# Patient Record
Sex: MALE | Race: WHITE | URBAN - METROPOLITAN AREA
[De-identification: names, ages, dates, MRNs, and addresses within clinical notes are randomized per-mention and may not be internally consistent; named-entity substitution may affect disease eponyms.]

---

## 2023-06-01 ENCOUNTER — HOSPITAL ENCOUNTER (OUTPATIENT)
Age: 23
Discharge: HOME | End: 2023-06-01
Payer: COMMERCIAL

## 2023-06-01 ENCOUNTER — APPOINTMENT (OUTPATIENT)
Dept: GENERAL RADIOLOGY | Age: 23
End: 2023-06-01
Attending: PHYSICIAN ASSISTANT
Payer: COMMERCIAL

## 2023-06-01 VITALS
TEMPERATURE: 98 F | DIASTOLIC BLOOD PRESSURE: 80 MMHG | HEART RATE: 85 BPM | OXYGEN SATURATION: 100 % | SYSTOLIC BLOOD PRESSURE: 128 MMHG | RESPIRATION RATE: 20 BRPM

## 2023-06-01 DIAGNOSIS — J02.9 ACUTE VIRAL PHARYNGITIS: Primary | ICD-10-CM

## 2023-06-01 DIAGNOSIS — R05.1 ACUTE COUGH: ICD-10-CM

## 2023-06-01 LAB — S PYO AG THROAT QL IA.RAPID: NEGATIVE

## 2023-06-01 PROCEDURE — 99204 OFFICE O/P NEW MOD 45 MIN: CPT

## 2023-06-01 PROCEDURE — 87651 STREP A DNA AMP PROBE: CPT | Performed by: PHYSICIAN ASSISTANT

## 2023-06-01 PROCEDURE — 71046 X-RAY EXAM CHEST 2 VIEWS: CPT | Performed by: PHYSICIAN ASSISTANT

## 2023-06-01 RX ORDER — ALBUTEROL SULFATE 90 UG/1
2 AEROSOL, METERED RESPIRATORY (INHALATION) EVERY 4 HOURS PRN
Qty: 1 EACH | Refills: 0 | Status: SHIPPED | OUTPATIENT
Start: 2023-06-01 | End: 2023-07-01

## 2023-06-01 RX ORDER — IBUPROFEN 600 MG/1
TABLET ORAL
Qty: 20 TABLET | Refills: 0 | Status: SHIPPED | OUTPATIENT
Start: 2023-06-01

## 2023-06-01 RX ORDER — BENZONATATE 100 MG/1
100 CAPSULE ORAL 3 TIMES DAILY PRN
Qty: 30 CAPSULE | Refills: 0 | Status: SHIPPED | OUTPATIENT
Start: 2023-06-01 | End: 2023-07-01

## 2024-05-28 ENCOUNTER — APPOINTMENT (OUTPATIENT)
Dept: GENERAL RADIOLOGY | Age: 24
End: 2024-05-28
Attending: NURSE PRACTITIONER

## 2024-05-28 ENCOUNTER — HOSPITAL ENCOUNTER (OUTPATIENT)
Age: 24
Discharge: HOME | End: 2024-05-28

## 2024-05-28 VITALS
OXYGEN SATURATION: 98 % | DIASTOLIC BLOOD PRESSURE: 91 MMHG | HEART RATE: 86 BPM | TEMPERATURE: 98 F | SYSTOLIC BLOOD PRESSURE: 141 MMHG | BODY MASS INDEX: 38.43 KG/M2 | WEIGHT: 290 LBS | HEIGHT: 73 IN | RESPIRATION RATE: 20 BRPM

## 2024-05-28 DIAGNOSIS — U07.1 COVID-19 VIRUS INFECTION: Primary | ICD-10-CM

## 2024-05-28 LAB
POCT INFLUENZA A: NEGATIVE
POCT INFLUENZA B: NEGATIVE
SARS-COV-2 RNA RESP QL NAA+PROBE: DETECTED

## 2024-05-28 PROCEDURE — 71046 X-RAY EXAM CHEST 2 VIEWS: CPT | Performed by: NURSE PRACTITIONER

## 2024-05-28 PROCEDURE — 99214 OFFICE O/P EST MOD 30 MIN: CPT

## 2024-05-28 PROCEDURE — 87502 INFLUENZA DNA AMP PROBE: CPT | Performed by: NURSE PRACTITIONER

## 2024-05-28 NOTE — ED PROVIDER NOTES
Patient Seen in: [facility]      History     Chief Complaint   Patient presents with    Flu     Day 5 of having a cold/flu. Sinus pressure, swollen lymph nodes, cough, plugged nose, sneezing, itchy eyes, diarrhea, hot/cold temperature - Entered by patient     Stated Complaint: Flu - Day 5 of having a cold/flu. Sinus pressure, swollen lymph nodes, cough, p*    Subjective:   HPI  22 yo male presents complaining of congestion, rhionorrhea, body aches, chills, feeling like tactile fever, and cough that started Friday.  Patient then with diarrhea since Saturday or Sunday with 3-4 episodes yesterday and one today without abdominal pain.  No known sick contacts.    Objective:   History reviewed. No pertinent past medical history.           History reviewed. No pertinent surgical history.             Social History     Socioeconomic History    Marital status: Single   Tobacco Use    Smoking status: Never    Smokeless tobacco: Never   Vaping Use    Vaping status: Never Used   Substance and Sexual Activity    Alcohol use: Never    Drug use: Never     Social Determinants of Health     Food Insecurity: Low Risk  (2/9/2023)    Received from [facility]    Food Insecurity     Have there been times that your food ran out, and you didn't have money to get more?: No     Are there times that you worry that this might happen?: No   Transportation Needs: Low Risk  (2/9/2023)    Received from [facility]    Transportation Needs     Do you have trouble getting transportation to medical appointments?: No     How do you normally get to and from your appointments?: Other              Review of Systems   All other systems reviewed and are negative.      Positive for stated complaint: Flu - Day 5 of having a cold/flu. Sinus pressure, swollen lymph nodes, cough, p*  Other systems are as noted in HPI.  Constitutional and vital signs reviewed.      All other systems reviewed and  negative except as noted above.    Physical Exam     ED Triage Vitals [05/28/24 1042]   BP (!) 141/91   Pulse 86   Resp 20   Temp 97.8 °F (36.6 °C)   Temp src Temporal   SpO2 98 %   O2 Device None (Room air)       Current Vitals:   Vital Signs  BP: (!) 141/91  Pulse: 86  Resp: 20  Temp: 97.8 °F (36.6 °C)  Temp src: Temporal    Oxygen Therapy  SpO2: 98 %  O2 Device: None (Room air)            Physical Exam  Vitals and nursing note reviewed.   Constitutional:       General: He is not in acute distress.     Appearance: He is well-developed. He is not ill-appearing or toxic-appearing.   HENT:      Right Ear: Tympanic membrane, ear canal and external ear normal.      Left Ear: Tympanic membrane, ear canal and external ear normal.      Nose: Congestion present. No rhinorrhea.      Mouth/Throat:      Pharynx: No oropharyngeal exudate or posterior oropharyngeal erythema.   Cardiovascular:      Rate and Rhythm: Normal rate and regular rhythm.      Heart sounds: Normal heart sounds.   Pulmonary:      Effort: Pulmonary effort is normal.      Breath sounds: Normal breath sounds.   Skin:     General: Skin is warm and dry.   Neurological:      Mental Status: He is alert and oriented to person, place, and time.             ED Course     Labs Reviewed   RAPID SARS-COV-2 BY PCR - Abnormal; Notable for the following components:       Result Value    Rapid SARS-CoV-2 by PCR Detected (*)     All other components within normal limits   POCT FLU TEST - Normal    Narrative:     This assay is a rapid molecular in vitro test utilizing nucleic acid amplification of influenza A and B viral RNA.             XR CHEST PA + LAT CHEST (CPT=71046)    Result Date: 5/28/2024  PROCEDURE:  XR CHEST PA + LAT CHEST (CPT=71046)  INDICATIONS:  cough positive COVID-19  COMPARISON:  None.  TECHNIQUE:  PA and lateral chest radiographs were obtained.  PATIENT STATED HISTORY: (As transcribed by Technologist)  Patient states sinus pressure, cough, congestion,  chills and diarrhea for the past 5 days. Turned out COVID-19 positive today.    FINDINGS:  LUNGS:  No focal consolidation.  Normal vascularity. CARDIAC:  Normal size cardiac silhouette. MEDIASTINUM:  Normal. PLEURA:  Normal.  No pleural effusions. BONES:  Normal for age.            CONCLUSION:  No acute process   LOCATION:  [facility]   Dictated by (CST): Rod Tolbert MD on 5/28/2024 at 11:52 AM     Finalized by (CST): Rod Tolbert MD on 5/28/2024 at 11:53 AM             [facility]     Medical Decision Making  24 yo male presents complaining of congestion, rhionorrhea, body aches, chills, feeling like tactile fever, and cough that started Friday.  Patient then with diarrhea since Saturday or Sunday with 3-4 episodes yesterday and one today without abdominal pain.  No known sick contacts.    Clinical impression: COVID    Differential diagnosis: COVID, flu, pneumonia    COVID-positive with negative flu.  Chest x-ray with no consolidation.  Patient is afebrile with normal oxygen on room air.  He will be discharged with symptomatic treatment with Tylenol/Motrin and Mucinex.    Problems Addressed:  COVID-19 virus infection: acute illness or injury    Amount and/or Complexity of Data Reviewed  Radiology: ordered and independent interpretation performed.     Details: Chest x-ray ordered with independent interpretation by myself as no consolidation        Disposition and Plan     Clinical Impression:  1. COVID-19 virus infection         Disposition:  Discharge  5/28/2024 12:01 pm    Follow-up:  No follow-up provider specified.        Medications Prescribed:  Discharge Medication List as of 5/28/2024 12:02 PM

## 2024-05-28 NOTE — ED INITIAL ASSESSMENT (HPI)
Symptoms started Friday:Sinus pressure, runny nose that comes congested, bodyaches, chills, body feels hot but didn't take temp. Has an occasional cough. Pt states having diarrhea can't recall if it started Saturday or Sunday. Denies emesis or nausea. Has minor headaches. Sore throat in the mornings.

## 2024-05-28 NOTE — DISCHARGE INSTRUCTIONS
Tylenol and Motrin alternating for body aches or fever.  Mucinex for congestion and cough.  Increase oral fluids.

## (undated) NOTE — LETTER
Patient: Feliz Odell   YOB: 2000   Date of Visit: 5/28/2024     Dear Employer,        May 28, 2024    At [facility], we are taking special precautions and doing everything we can to prevent the spread of COVID-19. During this time, we ask for your assistance regarding physician documentation for employees to return to work following a respiratory illness.     The Centers for Disease Control (CDC) recommends the following:    Ensure that sick leave policies are flexible and consistent with public health guidance, and employees are aware of and understand these policies.   Maintain flexible policies that permit employees to stay home to care for a sick family member or to take care of children due to school and  closures.   Employers should not require a COVID-19 test result or a healthcare provider’s note for sick employees to validate their illness, qualify for sick leave or return to work.   Be aware that healthcare provider offices and medical facilities may be extremely busy and not able to provide documentation in a timely manner. Most people with COVID-19 have mild illness, can recover at home without medical care and can follow CDC recommendations to determine when to discontinue home isolation and return to work.      Individuals with COVID-19 symptoms directed to care for themselves at home should:  Isolate for five days. If individuals are asymptomatic or symptoms are resolving (without fever for 24 hours), isolation may be discontinued on day six. Onset of symptoms is considered day zero.   Follow isolation by five days of mask wearing when around others to minimize the risk of infection.     Individuals infected with SARS-CoV-2 who never develop COVID-19 symptoms:    Day of positive test is considered day zero.   Isolate for five days.  Can discontinue isolation on day six.   Follow isolation by five days of wearing a mask when around others to minimize the risk of  infection.    Individuals who are asymptomatic but have been exposed:  For people who are unvaccinated or are more than six months out from their second mRNA dose (or more than two months after the J&J vaccine) and not yet boosted, CDC now recommends quarantine for five days followed by strict, mask use for an additional five days. Alternatively, if a five-day quarantine is not feasible, it is imperative that an exposed person wear a well-fitting mask at all times when around others for 10 days after exposure.   Individuals who have received their booster shot do not need to quarantine following an exposure but should wear a mask for 10 days after the exposure.    Best practice would also include a test on day five after exposure.   If symptoms occur, individuals should immediately quarantine until a negative test confirms symptoms are not attributable to COVID-19.     Please visit the CDC website for further information and details to assist you during this challenging time.     Sincerely,     No att. providers found